# Patient Record
Sex: MALE | Race: WHITE | Employment: STUDENT | URBAN - METROPOLITAN AREA
[De-identification: names, ages, dates, MRNs, and addresses within clinical notes are randomized per-mention and may not be internally consistent; named-entity substitution may affect disease eponyms.]

---
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
## 2024-10-13 ENCOUNTER — HOSPITAL ENCOUNTER (OUTPATIENT)
Age: 19
Discharge: HOME | End: 2024-10-13
Payer: COMMERCIAL

## 2024-10-13 VITALS
OXYGEN SATURATION: 99 % | RESPIRATION RATE: 18 BRPM | DIASTOLIC BLOOD PRESSURE: 79 MMHG | TEMPERATURE: 98 F | SYSTOLIC BLOOD PRESSURE: 127 MMHG | HEART RATE: 83 BPM

## 2024-10-13 DIAGNOSIS — K60.2 ANAL FISSURE: Primary | ICD-10-CM

## 2024-10-13 PROCEDURE — 99213 OFFICE O/P EST LOW 20 MIN: CPT

## 2024-10-13 NOTE — ED PROVIDER NOTES
Patient Seen in: [facility]      History     Chief Complaint   Patient presents with    Anal Problem     Stated Complaint: blood in stool; discomfort sitting  Subjective:   Phil is a 19-year-old male presenting to the immediate care complaining of blood from his rectal area.  Patient states that he has noticed some blood when he wipes for about the past few weeks to months.  Patient is here with his mom who states that she noticed the blood when she was doing his laundry so she made him come in today.  Patient states that he is not having any abdominal pain, back pain, flank pain.  Denies any urinary symptoms.  Denies any pain, redness or swelling to his penis or his testicles.  Patient states that he has not had any constipation or diarrhea.  States he is not having any pain with bowel movements.  Patient denies any anal penetration.  He is otherwise feeling well.  Patient has no other complaints or concerns.        Objective:   History reviewed. No pertinent past medical history.         History reviewed. No pertinent surgical history.           Social History     Socioeconomic History    Marital status: Single   Tobacco Use    Smoking status: Never    Smokeless tobacco: Never   Vaping Use    Vaping status: Never Used   Substance and Sexual Activity    Alcohol use: Never    Drug use: Never     Social Drivers of Health     Financial Resource Strain: Patient Unable To Answer (6/28/2024)    Received from [facility]    Overall Financial Resource Strain (CARDIA)     Difficulty of Paying Living Expenses: Patient unable to answer   Food Insecurity: No Food Insecurity (5/28/2024)    Received from [facility]    Hunger Vital Sign     Worried About Running Out of Food in the Last Year: Never true     Ran Out of Food in the Last Year: Never true   Transportation Needs: No Transportation Needs (5/28/2024)    Received from [facility]    POOL  - Transportation     Lack of Transportation (Medical): No     Lack of Transportation (Non-Medical): No   Housing Stability: Low Risk  (5/28/2024)    Received from [facility]    Housing Stability Vital Sign     Unable to Pay for Housing in the Last Year: No     Number of Places Lived in the Last Year: 1     Unstable Housing in the Last Year: No            Review of Systems    Positive for stated complaint: Anal Problem    Other systems are as noted in HPI.  Constitutional and vital signs reviewed.      All other systems reviewed and negative except as noted above.    Physical Exam     ED Triage Vitals [10/13/24 1300]   /79   Pulse 83   Resp 18   Temp 98.3 °F (36.8 °C)   Temp src Oral   SpO2 99 %   O2 Device None (Room air)     Current:/79   Pulse 83   Temp 98.3 °F (36.8 °C) (Oral)   Resp 18   SpO2 99%     Physical Exam  Vitals and nursing note reviewed. Exam conducted with a chaperone present (Suzanne Bernal RN).   Constitutional:       General: He is not in acute distress.     Appearance: Normal appearance. He is not ill-appearing, toxic-appearing or diaphoretic.   HENT:      Head: Normocephalic.   Cardiovascular:      Rate and Rhythm: Normal rate.   Pulmonary:      Effort: Pulmonary effort is normal. No respiratory distress.   Abdominal:      General: Abdomen is flat. Bowel sounds are normal. There is no distension.      Palpations: Abdomen is soft. There is no mass.      Tenderness: There is no abdominal tenderness. There is no right CVA tenderness, left CVA tenderness, guarding or rebound.      Hernia: No hernia is present.   Genitourinary:     Rectum: Guaiac result negative. Anal fissure present. No mass, tenderness, external hemorrhoid or internal hemorrhoid. Normal anal tone.      Comments: There are 2-3 small anal fissures to the anal crease approximately 3 to 4 cm above the anus.  Musculoskeletal:         General: Normal range of motion.      Cervical back: Normal range of  motion.   Skin:     General: Skin is warm and dry.      Capillary Refill: Capillary refill takes less than 2 seconds.   Neurological:      General: No focal deficit present.      Mental Status: He is alert and oriented to person, place, and time.   Psychiatric:         Mood and Affect: Mood normal.         Behavior: Behavior normal.         Thought Content: Thought content normal.         Judgment: Judgment normal.         ED Course   Radiology:  No results found.  Labs Reviewed - No data to display    MDM     Medical Decision Making  Differential diagnoses reflecting the complexity of care include but are not limited to hemorrhoids, anal fissure, less likely acute abdominal process or GI bleed.    Comorbidities that add complexity to management include: None  History obtained by an independent source was from: Patient  Patient is well appearing, non-toxic and in no acute distress.  Vital signs are stable.     Patient's history and physical exam are consistent with anal fissures.  Rectal exam is otherwise normal.  Having normal bowel movements.  No pain with bowel movements.  Abdominal exam is completely normal.  Guaiac was negative.  I recommended that patient be sure to clean his rectal area and buttocks very well after he uses the bathroom.  Recommended that he put a small amount of bacitracin on the fissures 2-3 times per day ensuring to clean in between.  Recommended that if the patient develops any abdominal pain, increased bleeding or any other worsening or concerning complaints to go to the emergency department.  Otherwise recommended following up with primary care provider.    ED precautions discussed.  Patient (guardian) advised to follow up with PCP in 2-3 days.  Patient (guardian) agrees with this plan of care.  Patient (guardian) verbalizes understanding of discharge instructions and plan of care.      Amount and/or Complexity of Data Reviewed  Labs: ordered. Decision-making details documented in ED  Course.    Risk  OTC drugs.        Disposition and Plan     Clinical Impression:  1. Anal fissure         Disposition:  Discharge  10/13/2024  1:46 pm    Follow-up:  Massiel Peck MD  [id number] [address]  [phone]          Trever Gallegos MD  [address] [address]  [phone]                Medications Prescribed:  Discharge Medication List as of 10/13/2024  1:51 PM

## 2024-10-13 NOTE — DISCHARGE INSTRUCTIONS
There is no blood in your stool on exam.  You do have some anal fissures in the crease of your buttocks.  Please be sure to keep very clean and use a wet wipe or wash well after you have a bowel movement.  Can use a small amount of bacitracin to the area 2-3 times a day as needed.  If you develop any abdominal pain, increased bleeding or any other worsening or concerning complaints you should go to the emergency department.  If you have persistent pain you should follow-up with a GI specialist.

## 2024-10-13 NOTE — ED INITIAL ASSESSMENT (HPI)
Patient reports blood in stool for a couple months. Worsening. Bright red. Also reports pain to rectum especially when sitting.

## (undated) NOTE — LETTER
Date & Time: 11/21/2019, 6:29 PM  Patient: Karen Prajapati  Encounter Provider(s):    Taye Santoyo MD       To Whom It May Concern:    Karen Prajapati was seen and treated in our department on 11/21/2019.  He should not participate in gym/sports until No

## (undated) NOTE — LETTER
Date & Time: 12/1/2019, 1:54 PM  Patient: Karena Soriano  Encounter Provider(s):    Bev Scott MD       To Whom It May Concern:    Karena Soriano was seen and treated in our department on 12/1/2019. He should not return to school until 12/06/2019.     If